# Patient Record
Sex: MALE | Race: WHITE | NOT HISPANIC OR LATINO | Employment: OTHER | URBAN - METROPOLITAN AREA
[De-identification: names, ages, dates, MRNs, and addresses within clinical notes are randomized per-mention and may not be internally consistent; named-entity substitution may affect disease eponyms.]

---

## 2017-05-08 RX ORDER — METOPROLOL SUCCINATE 50 MG/1
50 TABLET, EXTENDED RELEASE ORAL DAILY
Qty: 30 TABLET | Refills: 2 | Status: SHIPPED | OUTPATIENT
Start: 2017-05-08 | End: 2017-08-13 | Stop reason: SDUPTHER

## 2017-05-08 RX ORDER — METOPROLOL SUCCINATE 100 MG/1
TABLET, EXTENDED RELEASE ORAL
COMMUNITY
Start: 2014-07-31 | End: 2017-05-08 | Stop reason: SDUPTHER

## 2017-07-12 ENCOUNTER — TELEPHONE (OUTPATIENT)
Dept: CARDIOLOGY | Facility: CLINIC | Age: 82
End: 2017-07-12

## 2017-07-12 ENCOUNTER — CLINICAL SUPPORT NO REQUIREMENTS (OUTPATIENT)
Dept: CARDIOLOGY | Facility: CLINIC | Age: 82
End: 2017-07-12

## 2017-07-12 DIAGNOSIS — I48.20 CHRONIC ATRIAL FIBRILLATION (HCC): ICD-10-CM

## 2017-07-12 PROCEDURE — 93296 REM INTERROG EVL PM/IDS: CPT | Performed by: INTERNAL MEDICINE

## 2017-07-12 PROCEDURE — 93294 REM INTERROG EVL PM/LDLS PM: CPT | Performed by: INTERNAL MEDICINE

## 2017-07-12 NOTE — TELEPHONE ENCOUNTER
Called and spoke to pt and wife regarding needing a f/u appt.  Pt has no showed and cancelled.  His wife said there is no reason he can't come to his appt.  We are remotely following his pacemaker.  Transferred to scheduling to get an appt set up.

## 2017-08-14 RX ORDER — METOPROLOL SUCCINATE 50 MG/1
TABLET, EXTENDED RELEASE ORAL
Qty: 30 TABLET | Refills: 2 | Status: SHIPPED | OUTPATIENT
Start: 2017-08-14 | End: 2017-12-17 | Stop reason: SDUPTHER

## 2017-09-21 ENCOUNTER — TRANSCRIBE ORDERS (OUTPATIENT)
Dept: ADMINISTRATIVE | Facility: HOSPITAL | Age: 82
End: 2017-09-21

## 2017-09-21 DIAGNOSIS — Q61.9 CYSTIC KIDNEY DISEASE: Primary | ICD-10-CM

## 2017-09-25 RX ORDER — APIXABAN 2.5 MG/1
TABLET, FILM COATED ORAL
Qty: 60 TABLET | Refills: 2 | Status: SHIPPED | OUTPATIENT
Start: 2017-09-25 | End: 2018-01-22 | Stop reason: SDUPTHER

## 2017-10-07 ENCOUNTER — HOSPITAL ENCOUNTER (OUTPATIENT)
Dept: CT IMAGING | Facility: HOSPITAL | Age: 82
Discharge: HOME | End: 2017-10-07
Admitting: INTERNAL MEDICINE

## 2017-10-07 DIAGNOSIS — Q61.9 CYSTIC KIDNEY DISEASE: ICD-10-CM

## 2017-10-07 PROCEDURE — 82565 ASSAY OF CREATININE: CPT

## 2017-10-07 PROCEDURE — 74150 CT ABDOMEN W/O CONTRAST: CPT

## 2017-10-10 LAB — CREAT BLDA-MCNC: 1.7 MG/DL (ref 0.6–1.3)

## 2017-11-20 RX ORDER — METOPROLOL SUCCINATE 50 MG/1
TABLET, EXTENDED RELEASE ORAL
Qty: 30 TABLET | Refills: 2 | OUTPATIENT
Start: 2017-11-20

## 2017-12-18 RX ORDER — METOPROLOL SUCCINATE 50 MG/1
TABLET, EXTENDED RELEASE ORAL
Qty: 30 TABLET | Refills: 6 | Status: SHIPPED | OUTPATIENT
Start: 2017-12-18

## 2018-01-10 ENCOUNTER — CLINICAL SUPPORT NO REQUIREMENTS (OUTPATIENT)
Dept: CARDIOLOGY | Facility: CLINIC | Age: 83
End: 2018-01-10

## 2018-01-10 ENCOUNTER — TELEPHONE (OUTPATIENT)
Dept: CARDIOLOGY | Facility: CLINIC | Age: 83
End: 2018-01-10

## 2018-01-10 DIAGNOSIS — I48.20 CHRONIC ATRIAL FIBRILLATION (HCC): Primary | ICD-10-CM

## 2018-01-10 PROCEDURE — 93296 REM INTERROG EVL PM/IDS: CPT | Performed by: INTERNAL MEDICINE

## 2018-01-10 PROCEDURE — 93294 REM INTERROG EVL PM/LDLS PM: CPT | Performed by: INTERNAL MEDICINE

## 2018-01-10 NOTE — TELEPHONE ENCOUNTER
Pt was last in the office on 7/25/16.  Saw SD and had his PPM checked. Two no shows since then. He has continued to have remote monitoring.  I spoke with him today.  I will let Alexandra know to schedule him an in-ofc PPM check.

## 2018-01-22 RX ORDER — APIXABAN 2.5 MG/1
TABLET, FILM COATED ORAL
Qty: 60 TABLET | Refills: 2 | OUTPATIENT
Start: 2018-01-22

## 2018-05-16 ENCOUNTER — CLINICAL SUPPORT NO REQUIREMENTS (OUTPATIENT)
Dept: CARDIOLOGY | Facility: CLINIC | Age: 83
End: 2018-05-16

## 2018-05-16 DIAGNOSIS — I48.20 CHRONIC ATRIAL FIBRILLATION (HCC): Primary | ICD-10-CM

## 2018-05-16 PROCEDURE — 93296 REM INTERROG EVL PM/IDS: CPT | Performed by: INTERNAL MEDICINE

## 2018-05-16 PROCEDURE — 93294 REM INTERROG EVL PM/LDLS PM: CPT | Performed by: INTERNAL MEDICINE

## 2018-08-22 ENCOUNTER — TELEPHONE (OUTPATIENT)
Dept: CARDIOLOGY | Facility: CLINIC | Age: 83
End: 2018-08-22

## 2018-08-22 NOTE — TELEPHONE ENCOUNTER
Called and spoke to pt regarding who is following his pacemaker.  He was unaware of any appointments that he no showed.  Scheduled pt in clinic with Wm Hatch for follow with pacemaker.  Pt said he would be here.

## 2018-08-23 ENCOUNTER — OFFICE VISIT (OUTPATIENT)
Dept: CARDIOLOGY | Facility: CLINIC | Age: 83
End: 2018-08-23

## 2018-08-23 VITALS
DIASTOLIC BLOOD PRESSURE: 80 MMHG | SYSTOLIC BLOOD PRESSURE: 146 MMHG | WEIGHT: 180 LBS | HEIGHT: 67 IN | HEART RATE: 70 BPM | BODY MASS INDEX: 28.25 KG/M2

## 2018-08-23 DIAGNOSIS — I25.9 ISCHEMIC HEART DISEASE: Primary | ICD-10-CM

## 2018-08-23 DIAGNOSIS — I10 ESSENTIAL HYPERTENSION: ICD-10-CM

## 2018-08-23 PROCEDURE — 99213 OFFICE O/P EST LOW 20 MIN: CPT | Performed by: PHYSICIAN ASSISTANT

## 2018-08-23 PROCEDURE — 93280 PM DEVICE PROGR EVAL DUAL: CPT | Performed by: PHYSICIAN ASSISTANT

## 2018-08-23 RX ORDER — TAMSULOSIN HYDROCHLORIDE 0.4 MG/1
1 CAPSULE ORAL DAILY
COMMUNITY

## 2018-08-23 RX ORDER — TERAZOSIN 5 MG/1
5 CAPSULE ORAL NIGHTLY
COMMUNITY

## 2018-08-23 RX ORDER — RANITIDINE 150 MG/1
150 TABLET ORAL 2 TIMES DAILY
COMMUNITY

## 2018-08-23 RX ORDER — GALANTAMINE HYDROBROMIDE 8 MG/1
8 TABLET, FILM COATED ORAL 2 TIMES DAILY
COMMUNITY

## 2018-08-23 RX ORDER — FUROSEMIDE 20 MG/1
10 TABLET ORAL DAILY
COMMUNITY

## 2018-08-23 NOTE — PROGRESS NOTES
[facility]   OFFICE NOTE      Sigifredo Souza  1934  PCP: Gabriel Guerra MD    SUBJECTIVE:   Sigifredo Souza is a 83 y.o. male seen for a follow up visit regarding the following: Afib, IHD, HTN, [facility] [facility] Pacemaker    CC:Afib    Problem List:  1. Ischemic heart disease:  a. Abnormal stress echocardiogram, 10/10/2002.  b. [facility], Dr. Jorgensen, 10/21/2002:  i. Anteroapical hypokinesia, LVEF 50%.  ii. Complex single vessel LAD stenosis.  c. CABG x2, 10/24/2002, Dr. Franco:  i. LIMA side-to-side to proximal LAD and end-to-side to distal LAD.  d. Left pleural effusion status post thoracentesis.  2. Atrial flutter, treated with DC cardioversion, January 2008:  a. EP study and atrial flutter ablation, 11/18/2008.  b. Atrial flutter/fibrillation, paroxysmal with 19 hour episode on pacemaker interrogation, July 2013:  i. CHADS-VASC = 5.  ii. Apixaban instituted, 07/30/2013.  c. Patient was found not to be on apixaban, 05/13/2014.   3. Conduction system disease:  a. EP study, 11/18/2008, showed bifascicular block and poor AV node conduction with intermittent complete heart block.   b. [facility] [facility] dual-chamber pacemaker implantation, 11/18/2008.   c. S/P PPM PG change [facility] 4/13/2016  4. Hypertension.  5. Hyperlipidemia.  6. Type 2 diabetes mellitus.  7. History of pancreatitis.  8. Obesity, BMI 31.  9. Dementia.   10. BPH.   11. Surgical history:  a. Reattachment of right thumb, 1962.  b. Left testicle cystectomy, 1982.  HPI:   Mr. Souza is a pleasant 83-year-old gentleman with history of coronary disease, hypertension, atrial fib, and history of remote pacemaker and recent generator change 2016.  Mr. Souza also has a new diagnosis of mild dementia and sometimes does not recall his medical history.  He did not bring a list of his medications today.  He does recall that he is taking a beta blocker and his Eliquis on a regular basis as well as his diabetic pill and cholesterol  pill.  He denies that he is having any difficulty with chest pain, chest tightness or symptoms suggesting angina pectoris.  He is also been asymptomatic with his atrial fibrillation and now is permanent atrial fibrillation for the past 2 years.  He remains relatively active at home he admits he does have some mild dementia and this limits him but but he feels that he has not limited by having any shortness of breath or chest pain or palpitations.  He denies any hospitalizations, near-syncope, or syncope events.  He states he does check in with Dr. Pendleton regular basis.        ROS:  Review of Symptoms:  General: no recent weight loss/gain, weakness or fatigue  Skin: no rashes, lumps, or other skin changes  HEENT: no dizziness, lightheadedness, or vision changes  Respiratory: no cough or hemoptysis  Cardiovascular: no palpitations, and tachycardia  Gastrointestinal: no black/tarry stools or diarrhea  Urinary: no change in frequency or urgency  Peripheral Vascular: no claudication or leg cramps  Musculoskeletal: no muscle or joint pain/stiffness  Psychiatric: no depression or excessive stress  Neurological:+ dementia ,  no sensory or motor loss, no syncope  Hematologic: no anemia, easy bruising or bleeding  Endocrine: no thyroid problems, nor heat or cold intolerance    Cardiac PMH: (Old records have been reviewed and summarized below)      Past Medical History, Past Surgical History, Family history, Social History, and Medications were all reviewed with the patient today and updated as necessary.         No Known Allergies  Patient Active Problem List   Diagnosis   • Ischemic heart disease   • Atrial flutter (CMS/HCC)   • Cardiac conduction disorder s/p PPM   • Hypertension   • Hyperlipidemia   • Type 2 diabetes mellitus (CMS/HCC)   • Obesity (BMI 30-39.9)   • Dementia   • BPH (benign prostatic hypertrophy)   • Atrial fibrillation (CMS/HCC)     Past Medical History:   Diagnosis Date   • Diabetes mellitus (CMS/HCC)    •  "Pancreatitis      Past Surgical History:   Procedure Laterality Date   • CYSTECTOMY Left 1982    Left testicle cystectomy   • DIGIT REATTACHMENT Right 1962    Reattachment of right thumb   • PACEMAKER IMPLANTATION  11/18/2008    dual-chamber pacemaker     Family History   Problem Relation Age of Onset   • Heart disease Mother    • Heart attack Father      Social History   Substance Use Topics   • Smoking status: Never Smoker   • Smokeless tobacco: Never Used   • Alcohol use No         PHYSICAL EXAM:    /80 (BP Location: Left arm, Patient Position: Sitting)   Pulse 70   Ht 170.2 cm (67\")   Wt 81.6 kg (180 lb)   BMI 28.19 kg/m²        Wt Readings from Last 5 Encounters:   08/23/18 81.6 kg (180 lb)   07/25/16 76.5 kg (168 lb 9.6 oz)       BP Readings from Last 5 Encounters:   08/23/18 146/80   07/25/16 138/76       General appearance - Alert, well appearing, and in no distress   Mental status - Affect appropriate to mood.  Eyes - Sclerae anicteric,  ENMT - Hearing grossly normal bilaterally, Dental hygiene good.  Neck - Carotids upstroke normal bilaterally, no bruits, no JVD.  Resp - Clear to auscultation, no wheezes, rales or rhonchi, symmetric air entry.  Heart - Normal rate, regular rhythm, normal S1, S2, no murmurs, rubs, clicks or gallops.  GI - Soft, nontender, nondistended, no masses or organomegaly.  Neurological - Grossly intact - normal speech, no focal findings  Musculoskeletal - No joint tenderness, deformity or swelling, no muscular tenderness noted.  Extremities - Peripheral pulses normal, no pedal edema, no clubbing or cyanosis.  Skin - Normal coloration and turgor.  Psych -  oriented to person, place, and time.    Medical problems and test results were reviewed with the patient today.     No results found for this or any previous visit (from the past 672 hour(s)).      EKG: (EKG has been independently visualized by me and summarized below)    ECG 12 Lead  Date/Time: 8/23/2018 12:00 " PM  Performed by: GEMA LANTIGUA  Authorized by: GEMA LANTIGUA   Comparison: compared with previous ECG from 4/13/2016  Rhythm: atrial fibrillation and paced  Rate: normal  Conduction: left bundle branch block  ST Segments: ST segments normal  T Waves: T waves normal  QRS axis: normal  Clinical impression: dysrhythmia - atrial            Device Interrogation:  [facility] [facility] pacemaker a paced 1%.  V paced 98%.  R-wave  patient is dependent. RV threshold 0.7 V at 0.4 ms.  Atrial impedance 350 ohms.  RV impedance 400 ohms.  Battery voltage is 2.99 with 10 years remaining.  Patient and chronic atrial fibrillation.    ASSESSMENT   1. Permanent asymptomatic Afib.  Eliquis 2.5mg BID.  Patient is rate controlled.  2. HTN: Controlled  3. IHD: No angina symptoms.  Continue asa, bb, Statin.   4. [facility] : Normal Function     PLAN  · Continue current medical therapy  · Return for pacemaker interrogation Dr. Morgan in 6 months  · Obtain correct list of home medications (patient did not bring a list today )      8/23/2018  10:55 AM    Will Mamie SINGH

## 2018-10-05 RX ORDER — APIXABAN 2.5 MG/1
TABLET, FILM COATED ORAL
Qty: 60 TABLET | Refills: 6 | Status: SHIPPED | OUTPATIENT
Start: 2018-10-05 | End: 2019-10-15 | Stop reason: SDUPTHER

## 2018-11-21 ENCOUNTER — CLINICAL SUPPORT NO REQUIREMENTS (OUTPATIENT)
Dept: CARDIOLOGY | Facility: CLINIC | Age: 83
End: 2018-11-21

## 2018-11-21 DIAGNOSIS — I45.9 CARDIAC CONDUCTION DISORDER: Primary | ICD-10-CM

## 2018-11-21 DIAGNOSIS — I48.20 CHRONIC ATRIAL FIBRILLATION (HCC): ICD-10-CM

## 2018-11-21 PROCEDURE — 93294 REM INTERROG EVL PM/LDLS PM: CPT | Performed by: INTERNAL MEDICINE

## 2018-11-21 PROCEDURE — 93296 REM INTERROG EVL PM/IDS: CPT | Performed by: INTERNAL MEDICINE

## 2018-12-10 ENCOUNTER — TRANSCRIBE ORDERS (OUTPATIENT)
Dept: ADMINISTRATIVE | Facility: HOSPITAL | Age: 83
End: 2018-12-10

## 2018-12-10 DIAGNOSIS — R31.9 HEMATURIA, UNSPECIFIED TYPE: Primary | ICD-10-CM

## 2018-12-13 ENCOUNTER — HOSPITAL ENCOUNTER (OUTPATIENT)
Dept: ULTRASOUND IMAGING | Facility: HOSPITAL | Age: 83
Discharge: HOME | End: 2018-12-13
Attending: INTERNAL MEDICINE | Admitting: INTERNAL MEDICINE

## 2018-12-13 DIAGNOSIS — R31.9 HEMATURIA, UNSPECIFIED TYPE: ICD-10-CM

## 2018-12-13 PROCEDURE — 76775 US EXAM ABDO BACK WALL LIM: CPT

## 2018-12-20 ENCOUNTER — TRANSCRIBE ORDERS (OUTPATIENT)
Dept: NUTRITION | Facility: HOSPITAL | Age: 83
End: 2018-12-20

## 2018-12-20 DIAGNOSIS — R79.89 ELEVATED SERUM CREATININE: Primary | ICD-10-CM

## 2019-03-19 ENCOUNTER — CLINICAL SUPPORT NO REQUIREMENTS (OUTPATIENT)
Dept: CARDIOLOGY | Facility: CLINIC | Age: 84
End: 2019-03-19

## 2019-03-19 DIAGNOSIS — I48.20 CHRONIC ATRIAL FIBRILLATION (HCC): ICD-10-CM

## 2019-03-19 PROCEDURE — 93295 DEV INTERROG REMOTE 1/2/MLT: CPT | Performed by: INTERNAL MEDICINE

## 2019-03-19 PROCEDURE — 93296 REM INTERROG EVL PM/IDS: CPT | Performed by: INTERNAL MEDICINE

## 2019-04-08 ENCOUNTER — OFFICE VISIT (OUTPATIENT)
Dept: CARDIOLOGY | Facility: CLINIC | Age: 84
End: 2019-04-08

## 2019-04-08 VITALS
SYSTOLIC BLOOD PRESSURE: 140 MMHG | DIASTOLIC BLOOD PRESSURE: 82 MMHG | OXYGEN SATURATION: 97 % | HEART RATE: 92 BPM | WEIGHT: 185.8 LBS | BODY MASS INDEX: 28.16 KG/M2 | HEIGHT: 68 IN

## 2019-04-08 DIAGNOSIS — I48.21 PERMANENT ATRIAL FIBRILLATION (HCC): ICD-10-CM

## 2019-04-08 DIAGNOSIS — I45.9 CARDIAC CONDUCTION DISORDER: Primary | ICD-10-CM

## 2019-04-08 PROCEDURE — 99213 OFFICE O/P EST LOW 20 MIN: CPT | Performed by: PHYSICIAN ASSISTANT

## 2019-04-08 PROCEDURE — 93280 PM DEVICE PROGR EVAL DUAL: CPT | Performed by: PHYSICIAN ASSISTANT

## 2019-04-08 RX ORDER — LISINOPRIL 5 MG/1
5 TABLET ORAL DAILY
COMMUNITY

## 2019-04-08 NOTE — PROGRESS NOTES
Sigifredo Souza  1934  PCP: Gabriel Guerra MD    SUBJECTIVE:   Sigifredo Souza is a 84 y.o. male seen for a follow up visit regarding the following:     Chief Complaint: Follow up for afib, aflutter, sinus node dysfunction, pacemaker     HPI:    Since last visit the patient's status has been stable. He has been doing well overall. No cardiac complaints today. No swelling or drainage from pocket.       Cardiac PMH: (Old records have been reviewed and summarized below)    Problem List:  1. Ischemic heart disease:  a. Abnormal stress echocardiogram, 10/10/2002.  b. [facility], Dr. Jorgensen, 10/21/2002:  i. Anteroapical hypokinesia, LVEF 50%.  ii. Complex single vessel LAD stenosis.  c. CABG x2, 10/24/2002, Dr. Franco:  i. LIMA side-to-side to proximal LAD and end-to-side to distal LAD.  d. Left pleural effusion status post thoracentesis.  2. Atrial flutter, treated with DC cardioversion, January 2008:  a. EP study and atrial flutter ablation, 11/18/2008.  b. Atrial flutter/fibrillation, paroxysmal with 19 hour episode on pacemaker interrogation, July 2013:  i. CHADS-VASC = 5.  ii. Apixaban instituted, 07/30/2013.  c. Patient was found not to be on apixaban, 05/13/2014.   3. Conduction system disease:  a. EP study, 11/18/2008, showed bifascicular block and poor AV node conduction with intermittent complete heart block.   b. [facility] [facility] dual-chamber pacemaker implantation, 11/18/2008.   c. S/P PPM PG change [facility] 4/13/2016  4. Hypertension.  5. Hyperlipidemia.  6. Type 2 diabetes mellitus.  7. History of pancreatitis.  8. Obesity, BMI 31.  9. Dementia.   10. BPH.   11. Surgical history:  a. Reattachment of right thumb, 1962.  b. Left testicle cystectomy, 1982.          Current Outpatient Medications:   •  ELIQUIS 2.5 MG tablet tablet, TAKE ONE TABLET BY MOUTH EVERY 12 HOURS, Disp: 60 tablet, Rfl: 6  •  furosemide (LASIX) 20 MG tablet, Take 10 mg by mouth Daily. 1/2 tab daily, Disp: , Rfl:   •  galantamine  "(RAZADYNE) 8 MG tablet, Take 8 mg by mouth 2 (Two) Times a Day. 2 tabs in the morning, 2 tab in the evening, Disp: , Rfl:   •  lisinopril (PRINIVIL,ZESTRIL) 5 MG tablet, Take 5 mg by mouth Daily., Disp: , Rfl:   •  metoprolol succinate XL (TOPROL-XL) 50 MG 24 hr tablet, TAKE ONE TABLET BY MOUTH ONCE DAILY (MUST MAKE APPOINTMENT), Disp: 30 tablet, Rfl: 6  •  raNITIdine (ZANTAC) 150 MG tablet, Take 150 mg by mouth 2 (Two) Times a Day., Disp: , Rfl:   •  SITagliptin (JANUVIA) 50 MG tablet, Take 50 mg by mouth Daily., Disp: , Rfl:   •  tamsulosin (FLOMAX) 0.4 MG capsule 24 hr capsule, Take 1 capsule by mouth Daily., Disp: , Rfl:   •  terazosin (HYTRIN) 5 MG capsule, Take 5 mg by mouth Every Night., Disp: , Rfl:     Past Medical History, Past Surgical History, Family history, Social History, and Medications were all reviewed with the patient today and updated as necessary.       Patient Active Problem List   Diagnosis   • Ischemic heart disease   • Atrial flutter (CMS/HCC)   • Cardiac conduction disorder s/p PPM   • Hypertension   • Hyperlipidemia   • Type 2 diabetes mellitus (CMS/HCC)   • Obesity (BMI 30-39.9)   • Dementia   • BPH (benign prostatic hypertrophy)   • Atrial fibrillation (CMS/HCC)     No Known Allergies  Past Medical History:   Diagnosis Date   • Diabetes mellitus (CMS/HCC)    • Pancreatitis      Past Surgical History:   Procedure Laterality Date   • CYSTECTOMY Left 1982    Left testicle cystectomy   • DIGIT REATTACHMENT Right 1962    Reattachment of right thumb   • PACEMAKER IMPLANTATION  11/18/2008    dual-chamber pacemaker     Family History   Problem Relation Age of Onset   • Heart disease Mother    • Heart attack Father      Social History     Tobacco Use   • Smoking status: Never Smoker   • Smokeless tobacco: Never Used   Substance Use Topics   • Alcohol use: No         PHYSICAL EXAM:    /82 (BP Location: Right arm, Patient Position: Sitting)   Pulse 92   Ht 171.5 cm (67.5\")   Wt 84.3 kg " (185 lb 12.8 oz)   SpO2 97%   BMI 28.67 kg/m²        Wt Readings from Last 5 Encounters:   04/08/19 84.3 kg (185 lb 12.8 oz)   08/23/18 81.6 kg (180 lb)   07/25/16 76.5 kg (168 lb 9.6 oz)       BP Readings from Last 5 Encounters:   04/08/19 140/82   08/23/18 146/80   07/25/16 138/76       General-Well Nourished, Well developed  Eyes - PERRLA  Neck- supple, No mass  CV- regular rate and rhythm, no MRG, No edema  Lung- clear bilaterally  Abd- soft, +BS  Musc/skel - Norm strength and range of motion  Skin- warm and dry  Neuro - Alert & Oriented x 3, appropriate mood.        Medical problems and test results were reviewed with the patient today.     No results found for this or any previous visit (from the past 672 hour(s)).      EKG: (EKG has been independently visualized by me and summarized below)    Procedures     ASSESSMENT and PLAN    1. Permanent asymptomatic Afib.  Eliquis 2.5mg BID.  Patient is rate controlled.  2. HTN: Controlled  3. IHD: No angina symptoms.  Continue asa, bb, Statin.   4. [facility] : Normal Function       Return in about 6 months (around 10/8/2019).        Megan Encinas PA-C   Cardiology/Electrophysiology  4/8/2019  3:21 PM

## 2019-06-18 ENCOUNTER — CLINICAL SUPPORT NO REQUIREMENTS (OUTPATIENT)
Dept: CARDIOLOGY | Facility: CLINIC | Age: 84
End: 2019-06-18

## 2019-06-18 DIAGNOSIS — I48.21 PERMANENT ATRIAL FIBRILLATION (HCC): ICD-10-CM

## 2019-06-18 PROCEDURE — 93294 REM INTERROG EVL PM/LDLS PM: CPT | Performed by: PHYSICIAN ASSISTANT

## 2019-06-18 PROCEDURE — 93296 REM INTERROG EVL PM/IDS: CPT | Performed by: PHYSICIAN ASSISTANT

## 2019-09-17 ENCOUNTER — CLINICAL SUPPORT NO REQUIREMENTS (OUTPATIENT)
Dept: CARDIOLOGY | Facility: CLINIC | Age: 84
End: 2019-09-17

## 2019-09-17 DIAGNOSIS — I48.21 PERMANENT ATRIAL FIBRILLATION (HCC): ICD-10-CM

## 2019-09-17 PROCEDURE — 93294 REM INTERROG EVL PM/LDLS PM: CPT | Performed by: PHYSICIAN ASSISTANT

## 2019-09-17 PROCEDURE — 93296 REM INTERROG EVL PM/IDS: CPT | Performed by: PHYSICIAN ASSISTANT

## 2019-11-04 ENCOUNTER — OFFICE VISIT (OUTPATIENT)
Dept: CARDIOLOGY | Facility: CLINIC | Age: 84
End: 2019-11-04

## 2019-11-04 VITALS
OXYGEN SATURATION: 98 % | BODY MASS INDEX: 28.88 KG/M2 | HEIGHT: 67 IN | HEART RATE: 70 BPM | WEIGHT: 184 LBS | DIASTOLIC BLOOD PRESSURE: 80 MMHG | SYSTOLIC BLOOD PRESSURE: 168 MMHG

## 2019-11-04 DIAGNOSIS — I45.9 CARDIAC CONDUCTION DISORDER: ICD-10-CM

## 2019-11-04 PROCEDURE — 93280 PM DEVICE PROGR EVAL DUAL: CPT | Performed by: PHYSICIAN ASSISTANT

## 2019-11-04 PROCEDURE — 99214 OFFICE O/P EST MOD 30 MIN: CPT | Performed by: PHYSICIAN ASSISTANT

## 2019-11-04 RX ORDER — FINASTERIDE 5 MG/1
5 TABLET, FILM COATED ORAL DAILY
COMMUNITY
Start: 2019-07-31

## 2019-11-04 RX ORDER — AMLODIPINE BESYLATE 2.5 MG/1
2.5 TABLET ORAL DAILY
Qty: 30 TABLET | Refills: 5 | Status: SHIPPED | OUTPATIENT
Start: 2019-11-04

## 2019-11-04 NOTE — PROGRESS NOTES
Sigifredo Souza  1934  PCP: Gabriel Guerra MD    SUBJECTIVE:   Sigifredo Souza is a 84 y.o. male seen for a follow up visit regarding the following:     Chief Complaint: Follow up for afib, pacemaker     HPI:    Mr. Souza is an 84 year old male with the below PMHx that presents today for routine follow-up. He has been stable from cardiac standpoint. He tells me he remains active. No issues with cp, sob, edema, palps. He has been diagnosed with dementia this past year.     Problem List:  1. Ischemic heart disease:  a. Abnormal stress echocardiogram, 10/10/2002.  b. [facility], Dr. Jorgensen, 10/21/2002:  i. Anteroapical hypokinesia, LVEF 50%.  ii. Complex single vessel LAD stenosis.  c. CABG x2, 10/24/2002, Dr. Franco:  i. LIMA side-to-side to proximal LAD and end-to-side to distal LAD.  d. Left pleural effusion status post thoracentesis.  2. Atrial flutter, treated with DC cardioversion, January 2008:  a. EP study and atrial flutter ablation, 11/18/2008.  b. Atrial flutter/fibrillation, paroxysmal with 19 hour episode on pacemaker interrogation, July 2013:  i. CHADS-VASC = 5.  ii. Apixaban instituted, 07/30/2013.  c. Patient was found not to be on apixaban, 05/13/2014.   3. Conduction system disease:  a. EP study, 11/18/2008, showed bifascicular block and poor AV node conduction with intermittent complete heart block.   b. [facility] dual-chamber pacemaker implantation, 11/18/2008.   c. S/P PPM PG change [facility] 4/13/2016  4. Hypertension.  5. Hyperlipidemia.  6. Type 2 diabetes mellitus.  7. History of pancreatitis.  8. Obesity, BMI 31.  9. Dementia.   10. BPH.   11. Surgical history:  a. Reattachment of right thumb, 1962.  b. Left testicle cystectomy, 1982.      Current Outpatient Medications:   •  apixaban (ELIQUIS) 2.5 MG tablet tablet, Take 1 tablet by mouth Every 12 (Twelve) Hours., Disp: 60 tablet, Rfl: 6  •  finasteride (PROSCAR) 5 MG tablet, Take 5 mg by mouth Daily., Disp: , Rfl:   •  furosemide  (LASIX) 20 MG tablet, Take 10 mg by mouth Daily. 1/2 tab daily, Disp: , Rfl:   •  galantamine (RAZADYNE) 8 MG tablet, Take 8 mg by mouth 2 (Two) Times a Day. 2 tabs in the morning, 2 tab in the evening, Disp: , Rfl:   •  lisinopril (PRINIVIL,ZESTRIL) 5 MG tablet, Take 5 mg by mouth Daily., Disp: , Rfl:   •  metoprolol succinate XL (TOPROL-XL) 50 MG 24 hr tablet, TAKE ONE TABLET BY MOUTH ONCE DAILY (MUST MAKE APPOINTMENT), Disp: 30 tablet, Rfl: 6  •  raNITIdine (ZANTAC) 150 MG tablet, Take 150 mg by mouth 2 (Two) Times a Day., Disp: , Rfl:   •  SITagliptin (JANUVIA) 50 MG tablet, Take 50 mg by mouth Daily., Disp: , Rfl:   •  tamsulosin (FLOMAX) 0.4 MG capsule 24 hr capsule, Take 1 capsule by mouth Daily., Disp: , Rfl:   •  terazosin (HYTRIN) 5 MG capsule, Take 5 mg by mouth Every Night., Disp: , Rfl:   •  amLODIPine (NORVASC) 2.5 MG tablet, Take 1 tablet by mouth Daily., Disp: 30 tablet, Rfl: 5    Past Medical History, Past Surgical History, Family history, Social History, and Medications were all reviewed with the patient today and updated as necessary.       Patient Active Problem List   Diagnosis   • Ischemic heart disease   • Atrial flutter (CMS/HCC)   • Cardiac conduction disorder s/p PPM   • Hypertension   • Hyperlipidemia   • Type 2 diabetes mellitus (CMS/HCC)   • Obesity (BMI 30-39.9)   • Dementia (CMS/HCC)   • BPH (benign prostatic hypertrophy)   • Atrial fibrillation (CMS/HCC)     No Known Allergies  Past Medical History:   Diagnosis Date   • Diabetes mellitus (CMS/HCC)    • Pancreatitis      Past Surgical History:   Procedure Laterality Date   • CYSTECTOMY Left 1982    Left testicle cystectomy   • DIGIT REATTACHMENT Right 1962    Reattachment of right thumb   • PACEMAKER IMPLANTATION  11/18/2008    dual-chamber pacemaker     Family History   Problem Relation Age of Onset   • Heart disease Mother    • Heart attack Father      Social History     Tobacco Use   • Smoking status: Never Smoker   • Smokeless  "tobacco: Never Used   Substance Use Topics   • Alcohol use: No         PHYSICAL EXAM:    /80 (BP Location: Right arm, Patient Position: Sitting)   Pulse 70   Ht 170.2 cm (67\")   Wt 83.5 kg (184 lb)   SpO2 98%   BMI 28.82 kg/m²        Wt Readings from Last 5 Encounters:   11/04/19 83.5 kg (184 lb)   04/08/19 84.3 kg (185 lb 12.8 oz)   08/23/18 81.6 kg (180 lb)   07/25/16 76.5 kg (168 lb 9.6 oz)       BP Readings from Last 5 Encounters:   11/04/19 168/80   04/08/19 140/82   08/23/18 146/80   07/25/16 138/76       General-Well Nourished, Well developed  Eyes - PERRLA  Neck- supple, No mass  CV- regular rate and rhythm, no MRG, No edema  Lung- clear bilaterally  Abd- soft, +BS  Musc/skel - Norm strength and range of motion  Skin- warm and dry  Neuro - Alert & Oriented x 3, appropriate mood.        Medical problems and test results were reviewed with the patient today.     No results found for this or any previous visit (from the past 672 hour(s)).      EKG: (EKG has been independently visualized by me and summarized below)    Procedures     ASSESSMENT and PLAN    1. Permanent asymptomatic Afib.  Eliquis 2.5mg BID.  Patient is rate controlled.  2. HTN: Uncontrolled. Will add Amlodipine 2.5mg daily and titrate up as needed.   3. CAD: No angina symptoms.  Continue asa, bb, Statin.   4. [facility] : Normal Function. Afib 100%. RV pacing >99%        Return in about 6 months (around 5/4/2020) for SJM.        Megan Encinas PA-C  Cardiology/Electrophysiology  11/4/2019  10:35 AM  "

## 2019-12-17 ENCOUNTER — CLINICAL SUPPORT NO REQUIREMENTS (OUTPATIENT)
Dept: CARDIOLOGY | Facility: CLINIC | Age: 84
End: 2019-12-17

## 2019-12-17 DIAGNOSIS — I48.21 PERMANENT ATRIAL FIBRILLATION (HCC): ICD-10-CM

## 2019-12-17 PROCEDURE — 93296 REM INTERROG EVL PM/IDS: CPT | Performed by: INTERNAL MEDICINE

## 2019-12-17 PROCEDURE — 93294 REM INTERROG EVL PM/LDLS PM: CPT | Performed by: INTERNAL MEDICINE

## 2020-03-16 ENCOUNTER — TELEPHONE (OUTPATIENT)
Dept: CARDIOLOGY | Facility: CLINIC | Age: 85
End: 2020-03-16

## 2020-08-18 ENCOUNTER — TELEPHONE (OUTPATIENT)
Dept: CARDIOLOGY | Facility: CLINIC | Age: 85
End: 2020-08-18

## 2020-08-18 NOTE — TELEPHONE ENCOUNTER
Called pt due to [employer] home monitor isn't connecting.  Unable to reach pt with home phone says it is an invalid # and unable to reach on cell phone.

## 2021-12-26 NOTE — TELEPHONE ENCOUNTER
Called pt due to [employer] monitor not connecting.  Home # not valid and spouse # rings and then disconnects.  Unable to reach.   body aches/chest pain